# Patient Record
Sex: FEMALE | Race: WHITE | URBAN - METROPOLITAN AREA
[De-identification: names, ages, dates, MRNs, and addresses within clinical notes are randomized per-mention and may not be internally consistent; named-entity substitution may affect disease eponyms.]

---

## 2019-05-14 ENCOUNTER — OFFICE VISIT (OUTPATIENT)
Dept: FAMILY MEDICINE CLINIC | Age: 54
End: 2019-05-14

## 2019-05-14 VITALS
WEIGHT: 171.6 LBS | TEMPERATURE: 98.2 F | DIASTOLIC BLOOD PRESSURE: 84 MMHG | HEIGHT: 64 IN | OXYGEN SATURATION: 97 % | BODY MASS INDEX: 29.3 KG/M2 | SYSTOLIC BLOOD PRESSURE: 128 MMHG | HEART RATE: 75 BPM | RESPIRATION RATE: 17 BRPM

## 2019-05-14 DIAGNOSIS — S89.92XA INJURY OF LEFT KNEE, INITIAL ENCOUNTER: ICD-10-CM

## 2019-05-14 DIAGNOSIS — G89.29 CHRONIC PAIN OF LEFT KNEE: Primary | ICD-10-CM

## 2019-05-14 DIAGNOSIS — M25.562 CHRONIC PAIN OF LEFT KNEE: Primary | ICD-10-CM

## 2019-05-14 DIAGNOSIS — Z79.899 CONTROLLED SUBSTANCE AGREEMENT SIGNED: ICD-10-CM

## 2019-05-14 DIAGNOSIS — F41.9 ANXIETY: ICD-10-CM

## 2019-05-14 RX ORDER — LEVOTHYROXINE SODIUM 88 UG/1
TABLET ORAL
COMMUNITY
End: 2019-05-14 | Stop reason: SDUPTHER

## 2019-05-14 RX ORDER — SUMATRIPTAN 50 MG/1
50 TABLET, FILM COATED ORAL
Qty: 90 TAB | Refills: 0 | Status: SHIPPED | OUTPATIENT
Start: 2019-05-14 | End: 2019-05-14

## 2019-05-14 RX ORDER — ALPRAZOLAM 0.5 MG/1
0.5 TABLET ORAL
Qty: 30 TAB | Refills: 2 | Status: SHIPPED | OUTPATIENT
Start: 2019-05-14 | End: 2019-08-21 | Stop reason: SDUPTHER

## 2019-05-14 RX ORDER — ALPRAZOLAM 0.5 MG/1
0.5 TABLET ORAL
COMMUNITY
End: 2019-05-14 | Stop reason: SDUPTHER

## 2019-05-14 RX ORDER — LEVOTHYROXINE SODIUM 88 UG/1
88 TABLET ORAL
Qty: 90 TAB | Refills: 0 | Status: SHIPPED | OUTPATIENT
Start: 2019-05-14 | End: 2019-08-23 | Stop reason: DRUGHIGH

## 2019-05-14 RX ORDER — SUMATRIPTAN 50 MG/1
50 TABLET, FILM COATED ORAL
COMMUNITY
End: 2019-05-14 | Stop reason: SDUPTHER

## 2019-05-14 NOTE — PROGRESS NOTES
Seen at [facility], runner's knee, had cortisone injection 2019 , moved from [facility] Mari Ceron is a 52 y.o. female (: 1965) presenting to address: Chief Complaint Patient presents with  Knee Injury  
  left Vitals:  
 19 1446 BP: 128/84 Pulse: 75 Resp: 17 Temp: 98.2 °F (36.8 °C) TempSrc: Oral  
SpO2: 97% Weight: 171 lb 9.6 oz (77.8 kg) Height: 5' 4.33\" (1.634 m) PainSc:   3 PainLoc: Knee Hearing/Vision:  
 
 Visual Acuity Screening Right eye Left eye Both eyes Without correction:     
With correction: 20/20 20/20 20/20 Learning Assessment:  
 
Learning Assessment 2019 PRIMARY LEARNER Patient HIGHEST LEVEL OF EDUCATION - PRIMARY LEARNER  4 YEARS OF COLLEGE  
BARRIERS PRIMARY LEARNER NONE  
CO-LEARNER CAREGIVER No  
PRIMARY LANGUAGE ENGLISH  NEED No  
LEARNER PREFERENCE PRIMARY READING  
ANSWERED BY self RELATIONSHIP SELF Depression Screening: No flowsheet data found. Fall Risk Assessment:  
No flowsheet data found. Abuse Screening:  
 
Abuse Screening Questionnaire 2019 Do you ever feel afraid of your partner? Jeanne Lul Are you in a relationship with someone who physically or mentally threatens you? Jeanne Lul Is it safe for you to go home? Peter Manjarrez Coordination of Care Questionaire: 1. Have you been to the ER, urgent care clinic since your last visit? Hospitalized since your last visit? Yes, urgent care 2019  
 
 
2. Have you seen or consulted any other health care providers outside of the [address] [place] [address] since your last visit? Include any pap smears or colon screening. No  
 
Advanced Directive: 1. Do you have an Advanced Directive? No  
2. Would you like information on Advanced Directives? No  
 
Health Maintenance Due Topic Date Due  
 Hepatitis C Screening  1965  DTaP/Tdap/Td series (1 - Tdap) 10/06/1984  PAP AKA CERVICAL CYTOLOGY  10/06/1984  Shingrix Vaccine Age 50> (1 of 2) 10/06/2013  BREAST CANCER SCRN MAMMOGRAM  10/06/2013  FOBT Q 1 YEAR AGE 50-75  10/06/2013

## 2019-05-16 NOTE — PROGRESS NOTES
Subjective:  
  
Lore Rivera is a 52 y.o. female seen for evaluation and treatment of a left knee injury. Patient was seen in past 02/2019 by ortho in [place]. She moved here and has not been seen since. She continues with pain now. There are no active problems to display for this patient. Current Outpatient Medications Medication Sig Dispense Refill  multivitamin, tx-iron-ca-min (THERA-M W/ IRON) 9 mg iron-400 mcg tab tablet Take 1 Tab by mouth daily.  magnesium hydroxide (COTO MILK OF MAGNESIA) 311 mg chew Take  by mouth.  psyllium husk (METAMUCIL PO) Take  by mouth.  levothyroxine (SYNTHROID) 88 mcg tablet Take 1 Tab by mouth Daily (before breakfast). 90 Tab 0  ALPRAZolam (XANAX) 0.5 mg tablet Take 1 Tab by mouth daily as needed for Anxiety. 30 Tab 2  
 aluminum chloride (DRYSOL) 20 % external solution Apply 5 Drops to affected area nightly. 1 Bottle 1 No Known Allergies Past Medical History:  
Diagnosis Date  Anxiety 2011  
 Headache   
 since age 11  Thyroid disease 1999  
 hypothyroid Past Surgical History:  
Procedure Laterality Date [place] [facility]  2006  
 hysterectomy  HX HEENT    
 wisdom teeth Family History Problem Relation Age of Onset  Cancer Mother  Hypertension Mother  Cancer Father  Hypertension Brother  Cancer Paternal Grandmother Social History Tobacco Use  Smoking status: Former Smoker Packs/day: 0.25 Years: 10.00 Pack years: 2.50  Smokeless tobacco: Never Used Substance Use Topics  Alcohol use: Yes Comment: 2 beers a month Objective:  
 
Visit Vitals /84 (BP 1 Location: Left arm, BP Patient Position: Sitting) Pulse 75 Temp 98.2 °F (36.8 °C) (Oral) Resp 17 Ht 5' 4.33\" (1.634 m) Wt 171 lb 9.6 oz (77.8 kg) SpO2 97% BMI 29.15 kg/m² Appearance: alert, well appearing, and in no distress. Musculoskeletal exam: abnormal exam of left knee swelling noted, TTP on lateral side. Decreased flexion and extension with laxity in joint. Imaging 
is performed, appears abnormal - report shows patellofemoral compartment osteophyte formation Assessment/Plan:  
 
Encounter Diagnoses Name Primary?  Chronic pain of left knee Yes  Injury of left knee, initial encounter  Anxiety  Controlled substance agreement signed Orders Placed This Encounter  XR KNEE LT MIN 4 V  
 REFERRAL TO ORTHOPEDICS  
 DISCONTD: levothyroxine (SYNTHROID) 88 mcg tablet  DISCONTD: ALPRAZolam (XANAX) 0.5 mg tablet  DISCONTD: SUMAtriptan (IMITREX) 50 mg tablet  DISCONTD: aluminum chloride (DRYSOL) 20 % external solution  multivitamin, tx-iron-ca-min (THERA-M W/ IRON) 9 mg iron-400 mcg tab tablet  magnesium hydroxide (COTO MILK OF MAGNESIA) 311 mg chew  psyllium husk (METAMUCIL PO)  levothyroxine (SYNTHROID) 88 mcg tablet  ALPRAZolam (XANAX) 0.5 mg tablet  SUMAtriptan (IMITREX) 50 mg tablet  aluminum chloride (DRYSOL) 20 % external solution Refilled anxiety med  
 reviewed and ok Controlled signed Ortho referral entered  
rtc as needed

## 2019-08-21 ENCOUNTER — HOSPITAL ENCOUNTER (OUTPATIENT)
Dept: LAB | Age: 54
Discharge: HOME | End: 2019-08-21
Payer: COMMERCIAL

## 2019-08-21 ENCOUNTER — OFFICE VISIT (OUTPATIENT)
Dept: FAMILY MEDICINE CLINIC | Age: 54
End: 2019-08-21

## 2019-08-21 VITALS
OXYGEN SATURATION: 97 % | WEIGHT: 176.6 LBS | TEMPERATURE: 97.4 F | RESPIRATION RATE: 18 BRPM | HEART RATE: 70 BPM | DIASTOLIC BLOOD PRESSURE: 89 MMHG | HEIGHT: 64 IN | SYSTOLIC BLOOD PRESSURE: 137 MMHG | BODY MASS INDEX: 30.15 KG/M2

## 2019-08-21 DIAGNOSIS — F41.9 ANXIETY: ICD-10-CM

## 2019-08-21 DIAGNOSIS — Z00.00 ROUTINE GENERAL MEDICAL EXAMINATION AT A HEALTH CARE FACILITY: ICD-10-CM

## 2019-08-21 DIAGNOSIS — Z11.59 ENCOUNTER FOR HEPATITIS C SCREENING TEST FOR LOW RISK PATIENT: ICD-10-CM

## 2019-08-21 DIAGNOSIS — E03.9 ACQUIRED HYPOTHYROIDISM: ICD-10-CM

## 2019-08-21 DIAGNOSIS — E78.2 MIXED HYPERLIPIDEMIA: ICD-10-CM

## 2019-08-21 DIAGNOSIS — Z00.00 ROUTINE GENERAL MEDICAL EXAMINATION AT A HEALTH CARE FACILITY: Primary | ICD-10-CM

## 2019-08-21 DIAGNOSIS — Z23 ENCOUNTER FOR IMMUNIZATION: ICD-10-CM

## 2019-08-21 LAB
ALBUMIN SERPL-MCNC: 4.1 G/DL (ref 3.4–5)
ALBUMIN/GLOB SERPL: 1.2 {RATIO} (ref 0.8–1.7)
ALP SERPL-CCNC: 79 U/L (ref 45–117)
ALT SERPL-CCNC: 25 U/L (ref 13–56)
ANION GAP SERPL CALC-SCNC: 8 MMOL/L (ref 3–18)
AST SERPL-CCNC: 17 U/L (ref 10–38)
BILIRUB DIRECT SERPL-MCNC: <0.1 MG/DL (ref 0–0.2)
BILIRUB SERPL-MCNC: 0.4 MG/DL (ref 0.2–1)
BUN SERPL-MCNC: 19 MG/DL (ref 7–18)
BUN/CREAT SERPL: 20 (ref 12–20)
CALCIUM SERPL-MCNC: 9.4 MG/DL (ref 8.5–10.1)
CHLORIDE SERPL-SCNC: 102 MMOL/L (ref 100–111)
CO2 SERPL-SCNC: 29 MMOL/L (ref 21–32)
CREAT SERPL-MCNC: 0.96 MG/DL (ref 0.6–1.3)
ERYTHROCYTE [DISTWIDTH] IN BLOOD BY AUTOMATED COUNT: 14.3 % (ref 11.6–14.5)
EST. AVERAGE GLUCOSE BLD GHB EST-MCNC: 108 MG/DL
GLOBULIN SER CALC-MCNC: 3.3 G/DL (ref 2–4)
GLUCOSE SERPL-MCNC: 89 MG/DL (ref 74–99)
HBA1C MFR BLD: 5.4 % (ref 4.2–5.6)
HCT VFR BLD AUTO: 41.3 % (ref 35–45)
HGB BLD-MCNC: 13.4 G/DL (ref 12–16)
MCH RBC QN AUTO: 31.2 PG (ref 24–34)
MCHC RBC AUTO-ENTMCNC: 32.4 G/DL (ref 31–37)
MCV RBC AUTO: 96.3 FL (ref 74–97)
PLATELET # BLD AUTO: 368 K/UL (ref 135–420)
PMV BLD AUTO: 10.4 FL (ref 9.2–11.8)
POTASSIUM SERPL-SCNC: 5.2 MMOL/L (ref 3.5–5.5)
PROT SERPL-MCNC: 7.4 G/DL (ref 6.4–8.2)
RBC # BLD AUTO: 4.29 M/UL (ref 4.2–5.3)
SODIUM SERPL-SCNC: 139 MMOL/L (ref 136–145)
TSH SERPL DL<=0.05 MIU/L-ACNC: 4.23 UIU/ML (ref 0.36–3.74)
WBC # BLD AUTO: 5.5 K/UL (ref 4.6–13.2)

## 2019-08-21 PROCEDURE — 80061 LIPID PANEL: CPT

## 2019-08-21 PROCEDURE — 84443 ASSAY THYROID STIM HORMONE: CPT

## 2019-08-21 PROCEDURE — 80048 BASIC METABOLIC PNL TOTAL CA: CPT

## 2019-08-21 PROCEDURE — 83036 HEMOGLOBIN GLYCOSYLATED A1C: CPT

## 2019-08-21 PROCEDURE — 85027 COMPLETE CBC AUTOMATED: CPT

## 2019-08-21 PROCEDURE — 86803 HEPATITIS C AB TEST: CPT

## 2019-08-21 PROCEDURE — 80076 HEPATIC FUNCTION PANEL: CPT

## 2019-08-21 PROCEDURE — 36415 COLL VENOUS BLD VENIPUNCTURE: CPT

## 2019-08-21 PROCEDURE — 84439 ASSAY OF FREE THYROXINE: CPT

## 2019-08-21 RX ORDER — ALPRAZOLAM 0.5 MG/1
0.5 TABLET ORAL
Qty: 30 TAB | Refills: 2 | Status: SHIPPED | OUTPATIENT
Start: 2019-08-21 | End: 2020-02-19 | Stop reason: ALTCHOICE

## 2019-08-21 RX ORDER — RIZATRIPTAN BENZOATE 10 MG/1
10 TABLET ORAL
COMMUNITY

## 2019-08-21 NOTE — PROGRESS NOTES
Flu & TDAP Immunization/s administered 8/21/2019 by Lawson Strong LPN with patient consent. Patient tolerated procedure well. No reactions noted.

## 2019-08-21 NOTE — PROGRESS NOTES
Noelle Izquierdo is a 52 y.o. female (: 1965) presenting to address:    Chief Complaint   Patient presents with    Anxiety     follow up       Vitals:    19 0818   BP: 137/89   Pulse: 70   Resp: 18   Temp: 97.4 °F (36.3 °C)   TempSrc: Oral   SpO2: 97%   Weight: 176 lb 9.6 oz (80.1 kg)   Height: 5' 4.33\" (1.634 m)   PainSc:   2   PainLoc: Knee       Hearing/Vision:   No exam data present    Learning Assessment:     Learning Assessment 2019   PRIMARY LEARNER Patient   HIGHEST LEVEL OF EDUCATION - PRIMARY LEARNER  4 YEARS OF COLLEGE   BARRIERS PRIMARY LEARNER NONE   CO-LEARNER CAREGIVER No   PRIMARY LANGUAGE ENGLISH    NEED No   LEARNER PREFERENCE PRIMARY READING   ANSWERED BY self   RELATIONSHIP SELF     Depression Screening:     3 most recent PHQ Screens 2019   Little interest or pleasure in doing things Not at all   Feeling down, depressed, irritable, or hopeless Not at all   Total Score PHQ 2 0     Fall Risk Assessment:   No flowsheet data found. Abuse Screening:     Abuse Screening Questionnaire 2019   Do you ever feel afraid of your partner? N   Are you in a relationship with someone who physically or mentally threatens you? N   Is it safe for you to go home? Y     Coordination of Care Questionaire:   1. Have you been to the ER, urgent care clinic since your last visit? Hospitalized since your last visit? NO    2. Have you seen or consulted any other health care providers outside of the [address] [place] [address] since your last visit? Include any pap smears or colon screening. YES Ortho    Advanced Directive:   1. Do you have an Advanced Directive? NO    2. Would you like information on Advanced Directives?  NO

## 2019-08-21 NOTE — PROGRESS NOTES
Subjective:   52 y.o. female for Well Woman Check. Her gyne and breast care is done elsewhere by her Ob-Gyne physician. She will have records sent for mammo, pap. Current Outpatient Medications   Medication Sig Dispense Refill    rizatriptan (MAXALT) 10 mg tablet Take 10 mg by mouth once as needed for Migraine. May repeat in 2 hours if needed      ALPRAZolam (XANAX) 0.5 mg tablet Take 1 Tab by mouth daily as needed for Anxiety. 30 Tab 2    multivitamin, tx-iron-ca-min (THERA-M W/ IRON) 9 mg iron-400 mcg tab tablet Take 1 Tab by mouth daily.  magnesium hydroxide (COTO MILK OF MAGNESIA) 311 mg chew Take  by mouth.  psyllium husk (METAMUCIL PO) Take  by mouth.  levothyroxine (SYNTHROID) 88 mcg tablet Take 1 Tab by mouth Daily (before breakfast). 90 Tab 0    aluminum chloride (DRYSOL) 20 % external solution Apply 5 Drops to affected area nightly. 1 Bottle 1     No Known Allergies     Specific concerns today: Continues with intermittent anxiety. Uses Xanax as needed approximately 5 out of 7 days weekly. Continues to express good control with intermittent Xanax use. No SEs with medication use. Review of Systems  A comprehensive review of systems was negative. Objective:   Blood pressure 137/89, pulse 70, temperature 97.4 °F (36.3 °C), temperature source Oral, resp. rate 18, height 5' 4.33\" (1.634 m), weight 176 lb 9.6 oz (80.1 kg), SpO2 97 %.    Physical Examination:   General appearance - alert, well appearing, and in no distress  Mental status - alert, oriented to person, place, and time, normal mood, behavior, speech, dress, motor activity, and thought processes  Eyes - pupils equal and reactive, extraocular eye movements intact  Ears - bilateral TM's and external ear canals normal  Nose - normal and patent, no erythema, discharge or polyps  Mouth - mucous membranes moist, pharynx normal without lesions  Neck - supple, no significant adenopathy, thyroid exam: thyroid is normal in size without nodules or tenderness  Lymphatics - no palpable lymphadenopathy, no hepatosplenomegaly  Chest - clear to auscultation, no wheezes, rales or rhonchi, symmetric air entry  Heart - normal rate, regular rhythm, normal S1, S2, no murmurs, rubs, clicks or gallops  Abdomen - soft, nontender, nondistended, no masses or organomegaly  Neurological - alert, oriented, normal speech, no focal findings or movement disorder noted  Musculoskeletal - no joint tenderness, deformity or swelling  Extremities - peripheral pulses normal, no pedal edema, no clubbing or cyanosis  Skin - normal coloration and turgor, no rashes, no suspicious skin lesions noted     Assessment/Plan:   Continue Xanax for intermittent anxiety.  reviewed and okay refills provided. lose weight, increase physical activity, follow low fat diet, routine labs ordered, call if any problems  Encounter Diagnoses   Name Primary?  Routine general medical examination at a health care facility Yes    Encounter for immunization     Anxiety     Encounter for hepatitis C screening test for low risk patient      Orders Placed This Encounter    INFLUENZA VIRUS VACCINE QUADRIVALENT, PRESERVATIVE FREE SYRINGE (75404)    TETANUS, DIPHTHERIA TOXOIDS AND ACELLULAR PERTUSSIS VACCINE (TDAP), IN INDIVIDS. >=7, IM    CBC W/O DIFF    METABOLIC PANEL, BASIC    HEPATIC FUNCTION PANEL    LIPID PANEL    TSH 3RD GENERATION    T4, FREE    HEMOGLOBIN A1C WITH EAG    HEPATITIS C AB    rizatriptan (MAXALT) 10 mg tablet    ALPRAZolam (XANAX) 0.5 mg tablet     Labs ordered we will call patient with results, return to care for physical in 1 year return to care in 3 months for med follow-up.

## 2019-08-21 NOTE — PATIENT INSTRUCTIONS
Well Visit, Women 48 to 72: Care Instructions  Your Care Instructions    Physical exams can help you stay healthy. Your doctor has checked your overall health and may have suggested ways to take good care of yourself. He or she also may have recommended tests. At home, you can help prevent illness with healthy eating, regular exercise, and other steps. Follow-up care is a key part of your treatment and safety. Be sure to make and go to all appointments, and call your doctor if you are having problems. It's also a good idea to know your test results and keep a list of the medicines you take. How can you care for yourself at home? · Reach and stay at a healthy weight. This will lower your risk for many problems, such as obesity, diabetes, heart disease, and high blood pressure. · Get at least 30 minutes of exercise on most days of the week. Walking is a good choice. You also may want to do other activities, such as running, swimming, cycling, or playing tennis or team sports. · Do not smoke. Smoking can make health problems worse. If you need help quitting, talk to your doctor about stop-smoking programs and medicines. These can increase your chances of quitting for good. · Protect your skin from too much sun. When you're outdoors from 10 a.m. to 4 p.m., stay in the shade or cover up with clothing and a hat with a wide brim. Wear sunglasses that block UV rays. Even when it's cloudy, put broad-spectrum sunscreen (SPF 30 or higher) on any exposed skin. · See a dentist one or two times a year for checkups and to have your teeth cleaned. · Wear a seat belt in the car. Follow your doctor's advice about when to have certain tests. These tests can spot problems early. · Cholesterol. Your doctor will tell you how often to have this done based on your age, family history, or other things that can increase your risk for heart attack and stroke. · Blood pressure.  Have your blood pressure checked during a routine doctor visit. Your doctor will tell you how often to check your blood pressure based on your age, your blood pressure results, and other factors. · Mammogram. Ask your doctor how often you should have a mammogram, which is an X-ray of your breasts. A mammogram can spot breast cancer before it can be felt and when it is easiest to treat. · Pap test and pelvic exam. Ask your doctor how often you should have a Pap test. You may not need to have a Pap test as often as you used to. · Vision. Have your eyes checked every year or two or as often as your doctor suggests. Some experts recommend that you have yearly exams for glaucoma and other age-related eye problems starting at age 48. · Hearing. Tell your doctor if you notice any change in your hearing. You can have tests to find out how well you hear. · Diabetes. Ask your doctor whether you should have tests for diabetes. · Colorectal cancer. Your risk for colorectal cancer gets higher as you get older. Some experts say that adults should start regular screening at age 48 and stop at age 76. Others say to start before age 48 or continue after age 76. Talk with your doctor about your risk and when to start and stop screening. · Thyroid disease. Talk to your doctor about whether to have your thyroid checked as part of a regular physical exam. Women have an increased chance of a thyroid problem. · Osteoporosis. You should begin tests for bone density at age 72. If you are younger than 72, ask your doctor whether you have factors that may increase your risk for this disease. You may want to have this test before age 72. · Heart attack and stroke risk. At least every 4 to 6 years, you should have your risk for heart attack and stroke assessed. Your doctor uses factors such as your age, blood pressure, cholesterol, and whether you smoke or have diabetes to show what your risk for a heart attack or stroke is over the next 10 years.   When should you call for help?  Watch closely for changes in your health, and be sure to contact your doctor if you have any problems or symptoms that concern you. Where can you learn more? Go to http://hay-maria e.info/. Enter P206 in the search box to learn more about \"Well Visit, Women 50 to 72: Care Instructions. \"  Current as of: December 13, 2018  Content Version: 12.1  © 3021-1830 [employer], [employer]. Care instructions adapted under license by [facility] (which disclaims liability or warranty for this information). If you have questions about a medical condition or this instruction, always ask your healthcare professional. [address] any warranty or liability for your use of this information.

## 2019-08-22 LAB
CHOLEST SERPL-MCNC: 327 MG/DL
HCV AB SER IA-ACNC: 0.07 INDEX
HCV AB SERPL QL IA: NEGATIVE
HCV COMMENT,HCGAC: NORMAL
HDLC SERPL-MCNC: 76 MG/DL (ref 40–60)
HDLC SERPL: 4.3 {RATIO} (ref 0–5)
LDLC SERPL CALC-MCNC: 226 MG/DL (ref 0–100)
LIPID PROFILE,FLP: ABNORMAL
T4 FREE SERPL-MCNC: 1.2 NG/DL (ref 0.7–1.5)
TRIGL SERPL-MCNC: 125 MG/DL (ref ?–150)
VLDLC SERPL CALC-MCNC: 25 MG/DL

## 2019-08-23 RX ORDER — ATORVASTATIN CALCIUM 20 MG/1
20 TABLET, FILM COATED ORAL DAILY
Qty: 90 TAB | Refills: 0 | Status: SHIPPED | OUTPATIENT
Start: 2019-08-23 | End: 2020-02-19 | Stop reason: ALTCHOICE

## 2019-08-23 RX ORDER — LEVOTHYROXINE SODIUM 100 UG/1
100 TABLET ORAL
Qty: 90 TAB | Refills: 0 | Status: SHIPPED | OUTPATIENT
Start: 2019-08-23 | End: 2019-11-14 | Stop reason: SDUPTHER

## 2019-12-27 ENCOUNTER — TELEPHONE (OUTPATIENT)
Dept: FAMILY MEDICINE CLINIC | Age: 54
End: 2019-12-27

## 2019-12-27 DIAGNOSIS — E03.9 ACQUIRED HYPOTHYROIDISM: Primary | ICD-10-CM

## 2019-12-27 RX ORDER — LEVOTHYROXINE SODIUM 100 UG/1
TABLET ORAL
Qty: 30 TAB | Refills: 0 | Status: SHIPPED | OUTPATIENT
Start: 2019-12-27 | End: 2020-01-29

## 2019-12-27 NOTE — TELEPHONE ENCOUNTER
Informed pt Rx was filled for 30 day of thyroid medication; pt must establish care w/new provider and get labs completed before any more refills are approved; call was transferred to  for scheduling.

## 2019-12-27 NOTE — TELEPHONE ENCOUNTER
Patient called requesting a refill of thyroid medication; advised pt refill was denied d/t labs not completed; pt states she was not informed of that message; pt states she has 2 pills remaining [place] and will complete her labs upon her return and is requesting a 30 day refill of thyroid medication.

## 2020-01-29 ENCOUNTER — HOSPITAL ENCOUNTER (OUTPATIENT)
Dept: LAB | Age: 55
Discharge: HOME | End: 2020-01-29
Payer: COMMERCIAL

## 2020-01-29 DIAGNOSIS — E03.9 ACQUIRED HYPOTHYROIDISM: ICD-10-CM

## 2020-01-29 DIAGNOSIS — E78.2 MIXED HYPERLIPIDEMIA: ICD-10-CM

## 2020-01-29 LAB
CHOLEST SERPL-MCNC: 254 MG/DL
HDLC SERPL-MCNC: 65 MG/DL (ref 40–60)
HDLC SERPL: 3.9 {RATIO} (ref 0–5)
LDLC SERPL CALC-MCNC: 166.2 MG/DL (ref 0–100)
LIPID PROFILE,FLP: ABNORMAL
T4 FREE SERPL-MCNC: 1.5 NG/DL (ref 0.7–1.5)
TRIGL SERPL-MCNC: 114 MG/DL (ref ?–150)
TSH SERPL DL<=0.05 MIU/L-ACNC: 2.3 UIU/ML (ref 0.36–3.74)
VLDLC SERPL CALC-MCNC: 22.8 MG/DL

## 2020-01-29 PROCEDURE — 84439 ASSAY OF FREE THYROXINE: CPT

## 2020-01-29 PROCEDURE — 80061 LIPID PANEL: CPT

## 2020-01-29 PROCEDURE — 36415 COLL VENOUS BLD VENIPUNCTURE: CPT

## 2020-01-29 PROCEDURE — 84443 ASSAY THYROID STIM HORMONE: CPT

## 2020-02-19 ENCOUNTER — OFFICE VISIT (OUTPATIENT)
Dept: FAMILY MEDICINE CLINIC | Age: 55
End: 2020-02-19

## 2020-02-19 VITALS
WEIGHT: 179 LBS | SYSTOLIC BLOOD PRESSURE: 138 MMHG | TEMPERATURE: 98.5 F | RESPIRATION RATE: 20 BRPM | HEIGHT: 64 IN | OXYGEN SATURATION: 99 % | DIASTOLIC BLOOD PRESSURE: 90 MMHG | BODY MASS INDEX: 30.56 KG/M2 | HEART RATE: 78 BPM

## 2020-02-19 DIAGNOSIS — E03.9 ACQUIRED HYPOTHYROIDISM: ICD-10-CM

## 2020-02-19 DIAGNOSIS — G43.109 MIGRAINE WITH AURA AND WITHOUT STATUS MIGRAINOSUS, NOT INTRACTABLE: ICD-10-CM

## 2020-02-19 DIAGNOSIS — R03.0 PREHYPERTENSION: ICD-10-CM

## 2020-02-19 DIAGNOSIS — F51.04 PSYCHOPHYSIOLOGICAL INSOMNIA: ICD-10-CM

## 2020-02-19 DIAGNOSIS — Z12.39 SCREENING FOR BREAST CANCER: ICD-10-CM

## 2020-02-19 DIAGNOSIS — E78.00 PURE HYPERCHOLESTEROLEMIA: ICD-10-CM

## 2020-02-19 DIAGNOSIS — F41.1 GAD (GENERALIZED ANXIETY DISORDER): Primary | ICD-10-CM

## 2020-02-19 RX ORDER — LEVOTHYROXINE SODIUM 100 UG/1
100 TABLET ORAL
Qty: 90 TAB | Refills: 3 | Status: SHIPPED | OUTPATIENT
Start: 2020-02-19 | End: 2020-03-02

## 2020-02-19 RX ORDER — BUSPIRONE HYDROCHLORIDE 10 MG/1
10 TABLET ORAL 2 TIMES DAILY
Qty: 60 TAB | Refills: 0 | Status: SHIPPED | OUTPATIENT
Start: 2020-02-19 | End: 2020-06-15 | Stop reason: ALTCHOICE

## 2020-02-19 RX ORDER — TRAZODONE HYDROCHLORIDE 50 MG/1
50 TABLET ORAL
Qty: 30 TAB | Refills: 0 | Status: SHIPPED | OUTPATIENT
Start: 2020-02-19 | End: 2020-06-15 | Stop reason: ALTCHOICE

## 2020-02-19 NOTE — PROGRESS NOTES
Assessment/Plan:    1. ISABELLA (generalized anxiety disorder)  -discussed risk of longterm benzo use. Intol effexor. Let's try buspar. Stop xanax. - busPIRone (BUSPAR) 10 mg tablet; Take 1 Tab by mouth two (2) times a day. Dispense: 60 Tab; Refill: 0    2. Acquired hypothyroidism  -cont current. tsh good  - levothyroxine (SYNTHROID) 100 mcg tablet; Take 1 Tab by mouth Daily (before breakfast). Dispense: 90 Tab; Refill: 3    3. Prehypertension  -work on wt loss    4. Pure hypercholesterolemia  -declines statin. Work on diet    5. Migraine with aura and without status migrainosus, not intractable  -maxalt prn    6. Psychophysiological insomnia  -trial trazodone prn. Stop xanax  - traZODone (DESYREL) 50 mg tablet; Take 1 Tab by mouth nightly as needed for Sleep. Dispense: 30 Tab; Refill: 0    7. Screening for breast cancer  - [facility] MAMMO BI SCREENING INCL CAD; Future      The plan was discussed with the patient. The patient verbalized understanding and is in agreement with the plan. All medication potential side effects were discussed with the patient. Health Maintenance:  Get colo from [facility]. Health Maintenance   Topic Date Due    Breast Cancer Screen Mammogram  10/06/2013    FOBT Q1Y Age 54-65  10/06/2013    Shingrix Vaccine Age 50> (1 of 2) 07/03/2020 (Originally 10/6/2013)    Lipid Screen  01/29/2025    DTaP/Tdap/Td series (2 - Td) 08/21/2029    Hepatitis C Screening  Completed    Influenza Age 5 to Adult  Completed    Pneumococcal 0-64 years  Aged Out       Chele Campos is a 62 y.o. female and presents with Anxiety and Medication Evaluation     Subjective:  Pt here to establish care from [facility]. She is on xanax for sleep. Uses it fairly regularly. Not on maintenance med. A few years ago was started on xanax by psych. She uses it almost nightly. Previously on effexor, but she didn't like how she felt on it- had self-harming thoughts. No depression.       HLD - didn't take statin prescribed. Wants to work on diet/lifestyle changes. Migraines - has long hx, dx in teens. Previously were menstrual related. Improved since menopause. But gets one about every 8 weeks. Intensity is less. Uses maxalt prn. Does get aura. Hypothyroid - on synthroid. tsh recently was good. ROS:  Constitutional: No recent weight change. No weakness/fatigue. No f/c. Skin: No rashes, change in nails/hair, itching   HENT: No HA, dizziness. No hearing loss/tinnitus. No nasal congestion/discharge. Eyes: No change in vision, double/blurred vision or eye pain/redness. Cardiovascular: No CP/palpitations. No BARTHOLOMEW/orthopnea/PND. Respiratory: No cough/sputum, dyspnea, wheezing. Gastointestinal: No dysphagia, reflux. No n/v. No constipation/diarrhea. No melena/rectal bleeding. Genitourinary: No dysuria, urinary hesitancy, nocturia, hematuria. No incontinence. Musculoskeletal: No joint pain/stiffness. No muscle pain/tenderness. Endo: No heat/cold intolerance, no polyuria/polydypsia. Heme: No h/o anemia. No easy bleeding/bruising. Allergy/Immunology: No seasonal rhinitis. Denies frequent colds, sinus/ear infections. Neurological: No seizures/numbness/weakness. No paresthesias. Psychiatric:  No depression, +anxiety. The problem list was updated as a part of today's visit. Patient Active Problem List   Diagnosis Code    Acquired hypothyroidism E03.9    Pure hypercholesterolemia E78.00    Prehypertension R03.0    Migraine with aura and without status migrainosus, not intractable G43.109       The PSH, FH were reviewed. SH:  Social History     Tobacco Use    Smoking status: Former Smoker     Packs/day: 0.25     Years: 10.00     Pack years: 2.50     Last attempt to quit: 1998     Years since quittin.2    Smokeless tobacco: Never Used   Substance Use Topics    Alcohol use:  Yes     Alcohol/week: 0.0 standard drinks     Comment: 2 beers a month     Drug use: Never Medications/Allergies:  Current Outpatient Medications on File Prior to Visit   Medication Sig Dispense Refill    levothyroxine (SYNTHROID) 100 mcg tablet TAKE 1 TABLET BY MOUTH DAILY BEFORE BREAKFAST 30 Tab 0    rizatriptan (MAXALT) 10 mg tablet Take 10 mg by mouth once as needed for Migraine. May repeat in 2 hours if needed      ALPRAZolam (XANAX) 0.5 mg tablet Take 1 Tab by mouth daily as needed for Anxiety. 30 Tab 2    multivitamin, tx-iron-ca-min (THERA-M W/ IRON) 9 mg iron-400 mcg tab tablet Take 1 Tab by mouth daily.  magnesium hydroxide (COTO MILK OF MAGNESIA) 311 mg chew Take  by mouth.  psyllium husk (METAMUCIL PO) Take  by mouth.  aluminum chloride (DRYSOL) 20 % external solution Apply 5 Drops to affected area nightly. 1 Bottle 1    atorvastatin (LIPITOR) 20 mg tablet Take 1 Tab by mouth daily. 90 Tab 0     No current facility-administered medications on file prior to visit. No Known Allergies    Objective:  Visit Vitals  /90 (BP 1 Location: Left arm, BP Patient Position: Sitting)   Pulse 78   Temp 98.5 °F (36.9 °C)   Resp 20   Ht 5' 4.33\" (1.634 m)   Wt 179 lb (81.2 kg)   SpO2 99%   BMI 30.41 kg/m²      Constitutional: Well developed, nourished, no distress, alert, obese habitus   CV: S1, S2.  RRR. No murmurs/rubs. No edema. Pulm: No abnormalities on inspection. Clear to auscultation bilaterally. No wheezing/rhonchi. Normal effort. Psych: Appropriate affect, judgement and insight. Short-term memory intact.        Labwork and Ancillary Studies:    CBC w/Diff  Lab Results   Component Value Date/Time    WBC 5.5 08/21/2019 09:16 AM    HGB 13.4 08/21/2019 09:16 AM    PLATELET 111 41/38/9680 09:16 AM         Basic Metabolic Profile/LFTs  Lab Results   Component Value Date/Time    Sodium 139 08/21/2019 09:16 AM    Potassium 5.2 08/21/2019 09:16 AM    Chloride 102 08/21/2019 09:16 AM    CO2 29 08/21/2019 09:16 AM    Anion gap 8 08/21/2019 09:16 AM Glucose 89 08/21/2019 09:16 AM    BUN 19 (H) 08/21/2019 09:16 AM    Creatinine 0.96 08/21/2019 09:16 AM    BUN/Creatinine ratio 20 08/21/2019 09:16 AM    GFR est AA >60 08/21/2019 09:16 AM    GFR est non-AA >60 08/21/2019 09:16 AM    Calcium 9.4 08/21/2019 09:16 AM      Lab Results   Component Value Date/Time    ALT (SGPT) 25 08/21/2019 09:16 AM    AST (SGOT) 17 08/21/2019 09:16 AM    Alk.  phosphatase 79 08/21/2019 09:16 AM    Bilirubin, direct <0.1 08/21/2019 09:16 AM    Bilirubin, total 0.4 08/21/2019 09:16 AM       Cholesterol  Lab Results   Component Value Date/Time    Cholesterol, total 254 (H) 01/29/2020 07:36 AM    HDL Cholesterol 65 (H) 01/29/2020 07:36 AM    LDL, calculated 166.2 (H) 01/29/2020 07:36 AM    Triglyceride 114 01/29/2020 07:36 AM    CHOL/HDL Ratio 3.9 01/29/2020 07:36 AM

## 2020-02-19 NOTE — PROGRESS NOTES
Reji Santos is a 62 y.o. female (: 1965) presenting to address:    Chief Complaint   Patient presents with    Anxiety    Medication Evaluation       Vitals:    20 0825 20 0832   BP: (!) 146/98 138/90   Pulse: 78    Resp: 20    Temp: 98.5 °F (36.9 °C)    SpO2: 99%    Weight: 179 lb (81.2 kg)    Height: 5' 4.33\" (1.634 m)    PainSc:   0 - No pain        Hearing/Vision:   No exam data present    Learning Assessment:     Learning Assessment 2019   PRIMARY LEARNER Patient   HIGHEST LEVEL OF EDUCATION - PRIMARY LEARNER  4 YEARS OF COLLEGE   BARRIERS PRIMARY LEARNER NONE   CO-LEARNER CAREGIVER No   PRIMARY LANGUAGE ENGLISH    NEED No   LEARNER PREFERENCE PRIMARY READING   ANSWERED BY self   RELATIONSHIP SELF     Depression Screening:     3 most recent PHQ Screens 2020   Little interest or pleasure in doing things Not at all   Feeling down, depressed, irritable, or hopeless Not at all   Total Score PHQ 2 0     Fall Risk Assessment:   No flowsheet data found. Abuse Screening:     Abuse Screening Questionnaire 2020   Do you ever feel afraid of your partner? N   Are you in a relationship with someone who physically or mentally threatens you? N   Is it safe for you to go home? Y     Coordination of Care Questionaire:   1. Have you been to the ER, urgent care clinic since your last visit? Hospitalized since your last visit? No   2. Have you seen or consulted any other health care providers outside of the [address] [place] [address] since your last visit? Include any pap smears or colon screening. No     Advanced Directive:   1. Do you have an Advanced Directive? No   2. Would you like information on Advanced Directives?    No       Health Maintenance Due   Topic Date Due    PAP AKA CERVICAL CYTOLOGY  10/06/1984    Breast Cancer Screen Mammogram  10/06/2013    FOBT Q1Y Age 50-75  10/06/2013     Over due for mammogram   [place] at age 46 in [facility], good for 10 years

## 2020-12-02 RX ORDER — ALUMINUM CHLORIDE 20 %
5 SOLUTION, NON-ORAL TOPICAL
Qty: 1 BOTTLE | Refills: 1 | Status: SHIPPED | OUTPATIENT
Start: 2020-12-02

## 2020-12-29 ENCOUNTER — VIRTUAL VISIT (OUTPATIENT)
Dept: FAMILY MEDICINE CLINIC | Age: 55
End: 2020-12-29
Payer: COMMERCIAL

## 2020-12-29 DIAGNOSIS — M62.838 CERVICAL PARASPINAL MUSCLE SPASM: Primary | ICD-10-CM

## 2020-12-29 DIAGNOSIS — L98.9 SCALP LESION: ICD-10-CM

## 2020-12-29 PROCEDURE — 99214 OFFICE O/P EST MOD 30 MIN: CPT | Performed by: INTERNAL MEDICINE

## 2020-12-29 RX ORDER — NAPROXEN 500 MG/1
500 TABLET ORAL 2 TIMES DAILY WITH MEALS
Qty: 60 TAB | Refills: 0 | Status: SHIPPED | OUTPATIENT
Start: 2020-12-29

## 2020-12-29 RX ORDER — TIZANIDINE 4 MG/1
4 TABLET ORAL
Qty: 30 TAB | Refills: 0 | Status: SHIPPED | OUTPATIENT
Start: 2020-12-29 | End: 2021-01-07

## 2020-12-29 NOTE — PATIENT INSTRUCTIONS
[facility] Dr. Didi Barrow, Dr. Shaq Lee [facility], [address] [facility], [address] [place] [address] 
 [phone] [facility] Dr. Magdaleno Kovacs [facility], [address] [facility] [phone]

## 2020-12-29 NOTE — PROGRESS NOTES
Jin Villalpando is a 60 y.o. female who was seen by synchronous (real-time) audio-video technology on 12/29/2020 for Skin Problem    Assessment & Plan:   Diagnoses and all orders for this visit:    1. Cervical paraspinal muscle spasm- heat, stretches and nsaids.  -     naproxen (NAPROSYN) 500 mg tablet; Take 1 Tab by mouth two (2) times daily (with meals). -     tiZANidine (ZANAFLEX) 4 mg tablet; Take 1 Tab by mouth three (3) times daily as needed for Muscle Spasm(s). 2. Scalp lesion- concerned for malignancy. Referral derm. Pt to make appt with Dr. Umesh Umaña. Subjective:     Pt states she has eczema and has a patch on her scalp that's \"bad\". There is brown papule surrounded by white patch. It oozes clear liquid. It's itchy. Initially it was painful. Pt also c/o R neck pain x 3 weeks. States it feels like when she sleeps wrong. It radiates down to deltoid. Sits a lot for work now. Has tried setting up an ergonomic work station. Prior to Admission medications    Medication Sig Start Date End Date Taking? Authorizing Provider   aluminum chloride (Drysol) 20 % external solution Apply 5 Drops to affected area nightly. 12/2/20   Alexander Hugo MD   levothyroxine (SYNTHROID) 100 mcg tablet TAKE 1 TABLET BY MOUTH DAILY BEFORE BREAKFAST 3/2/20   Maggie Kwan MD   rizatriptan (MAXALT) 10 mg tablet Take 10 mg by mouth once as needed for Migraine. May repeat in 2 hours if needed    Provider, Historical   multivitamin, tx-iron-ca-min (THERA-M W/ IRON) 9 mg iron-400 mcg tab tablet Take 1 Tab by mouth daily. Provider, Historical   magnesium hydroxide (COTO MILK OF MAGNESIA) 311 mg chew Take  by mouth. Provider, Historical   psyllium husk (METAMUCIL PO) Take  by mouth.     Provider, Historical     Patient Active Problem List   Diagnosis Code    Acquired hypothyroidism E03.9    Pure hypercholesterolemia E78.00    Prehypertension R03.0    Migraine with aura and without status migrainosus, not intractable G43. 80    ISABELLA (generalized anxiety disorder) F41.1       Review of Systems   Musculoskeletal: Positive for neck pain. Skin: Positive for itching and rash. Neurological: Negative for focal weakness. Objective:     Patient-Reported Vitals 12/29/2020   Patient-Reported Weight 185   Patient-Reported Height 5'4\"        [INSTRUCTIONS:  \"[x]\" Indicates a positive item  \"[]\" Indicates a negative item  -- DELETE ALL ITEMS NOT EXAMINED]    Constitutional: [x] Appears well-developed and well-nourished [x] No apparent distress      [] Abnormal -     Mental status: [x] Alert and awake  [x] Oriented to person/place/time [x] Able to follow commands    [] Abnormal -     Eyes:   EOM    [x]  Normal    [] Abnormal -   Sclera  [x]  Normal    [] Abnormal -          Discharge [x]  None visible   [] Abnormal -     HENT: [x] Normocephalic, atraumatic  [] Abnormal -   [x] Mouth/Throat: Mucous membranes are moist    External Ears [x] Normal  [] Abnormal -    Neck: [x] No visualized mass [] Abnormal -     Pulmonary/Chest: [x] Respiratory effort normal   [x] No visualized signs of difficulty breathing or respiratory distress        [] Abnormal -      Musculoskeletal:   [] Normal gait with no signs of ataxia         [] Normal range of motion of neck        [] Abnormal - pain with looking toward R. No pain with full ROM of R shoulder. Neurological:        [] No Facial Asymmetry (Cranial nerve 7 motor function) (limited exam due to video visit)          [] No gaze palsy        [] Abnormal -          Skin:        [] No significant exanthematous lesions or discoloration noted on facial skin         [x] Abnormal -            Psychiatric:       [x] Normal Affect [] Abnormal -        [x] No Hallucinations    Other pertinent observable physical exam findings:-        We discussed the expected course, resolution and complications of the diagnosis(es) in detail.   Medication risks, benefits, costs, interactions, and alternatives were discussed as indicated. I advised her to contact the office if her condition worsens, changes or fails to improve as anticipated. She expressed understanding with the diagnosis(es) and plan. Gianna Neri, who was evaluated through a patient-initiated, synchronous (real-time) audio-video encounter, and/or her healthcare decision maker, is aware that it is a billable service, with coverage as determined by her insurance carrier. She provided verbal consent to proceed: Yes, and patient identification was verified. It was conducted pursuant to the emergency declaration under the [address] [place] [address], [address] [place] [address] authority and the [employer] General Act. A caregiver was present when appropriate. Ability to conduct physical exam was limited. I was in the office. The patient was at home.       Germán Rutherford MD

## 2021-01-07 DIAGNOSIS — M62.838 CERVICAL PARASPINAL MUSCLE SPASM: ICD-10-CM

## 2021-01-07 RX ORDER — TIZANIDINE 4 MG/1
TABLET ORAL
Qty: 30 TAB | Refills: 0 | Status: SHIPPED | OUTPATIENT
Start: 2021-01-07

## 2021-01-14 ENCOUNTER — TELEPHONE (OUTPATIENT)
Dept: FAMILY MEDICINE CLINIC | Age: 56
End: 2021-01-14

## 2021-01-14 NOTE — TELEPHONE ENCOUNTER
Pt returned call. WG requested this. She is not taking the medication that frequently. She said she will  the 2nd refill (from 1/7/21) today but doesn't need any more at this time. She said we can decline the refill.

## 2021-01-14 NOTE — TELEPHONE ENCOUNTER
WG sent refill request for tizanidine 4mg. This was ordered tid 12/29/20 and 1/07/2021 and now is coming for a refill today. Called pt to see if she is still taking this tid and truly needs a refill. Pt may need to be re-evaluated. Cannot reach pt, left vm to call back. Reviewed with Dr. Dottie Hahn. If pt is taking tid since 12/29/2020 she may need to re-evaluated in office.

## 2022-03-18 PROBLEM — G43.109 MIGRAINE WITH AURA AND WITHOUT STATUS MIGRAINOSUS, NOT INTRACTABLE: Status: ACTIVE | Noted: 2020-02-19

## 2022-03-19 PROBLEM — E78.00 PURE HYPERCHOLESTEROLEMIA: Status: ACTIVE | Noted: 2020-02-19

## 2022-03-19 PROBLEM — E03.9 ACQUIRED HYPOTHYROIDISM: Status: ACTIVE | Noted: 2020-02-19

## 2022-03-19 PROBLEM — F41.1 GAD (GENERALIZED ANXIETY DISORDER): Status: ACTIVE | Noted: 2020-02-19

## 2022-03-19 PROBLEM — R03.0 PREHYPERTENSION: Status: ACTIVE | Noted: 2020-02-19

## 2023-05-18 RX ORDER — TIZANIDINE 4 MG/1
1 TABLET ORAL 3 TIMES DAILY PRN
COMMUNITY
Start: 2021-01-07

## 2023-05-18 RX ORDER — NAPROXEN 500 MG/1
500 TABLET ORAL 2 TIMES DAILY WITH MEALS
COMMUNITY
Start: 2020-12-29

## 2023-05-18 RX ORDER — RIZATRIPTAN BENZOATE 10 MG/1
10 TABLET ORAL
COMMUNITY

## 2023-05-18 RX ORDER — LEVOTHYROXINE SODIUM 0.1 MG/1
TABLET ORAL
COMMUNITY
Start: 2020-03-02

## 2025-04-04 ENCOUNTER — TELEPHONE (OUTPATIENT)
Facility: HOSPITAL | Age: 60
End: 2025-04-04

## 2025-04-04 NOTE — TELEPHONE ENCOUNTER
Called pt to remind about upcoming eval and asked pt to check in at 7:50 to fill out ppw. Asked pt to bring insurance card & ID.

## 2025-04-08 ENCOUNTER — HOSPITAL ENCOUNTER (OUTPATIENT)
Facility: HOSPITAL | Age: 60
Setting detail: RECURRING SERIES
Discharge: HOME | End: 2025-04-11
Payer: COMMERCIAL

## 2025-04-08 PROCEDURE — 97530 THERAPEUTIC ACTIVITIES: CPT

## 2025-04-08 PROCEDURE — 97535 SELF CARE MNGMENT TRAINING: CPT

## 2025-04-08 PROCEDURE — 97161 PT EVAL LOW COMPLEX 20 MIN: CPT

## 2025-04-08 NOTE — THERAPY EVALUATION
MARIUSZ GONZÁLES [facility]  [id number] [facility], [address], [place] [address] Ph:[phone] Fx: [phone]  Plan of Care / Statement of Necessity for Physical Therapy Services     Patient Name: Tracy Rizzo : 1965   Medical   Diagnosis: Pain in right leg  Treatment Diagnosis:  M79.604  Pain in right leg  and M54.59, G89.29  CHRONIC LOWER BACK PAIN    Onset Date: 20+ YEARS ()     Referral Source: Davin Warren PA-C Start of Care (SOC): 2025   Prior Hospitalization: See medical history Provider #: [id number]   Prior Level of Function: Ind and pain free with ADLs and activities of leisure; gardening   Comorbidities: Other: thyroid. cholesterol       Assessment / key information:    Patient is a 59 y.o. year old female. Primary complaints of Pain in right leg that began on 20 years ago and most recent flare up at end of February due to unknown BERNADETTE. Rates pain as 3-5/10 from best to worst. Pain today is rated as 4/10. Describes pain as entire R LE as numb and tingling; inner thigh on R and lateral distal thigh is most painful, bilateral lower lumbar pain equal bilaterally and constant in nature. Heating pad, massage, anti-inflammatories (taken edge off) makes the pain better. Prolonged periods of static positioning; squatting/kneeling/crouching makes the pain worse. Sleeping has been affected due to pain waking her up at night; waking every 1-2 hours; prefers back sleeping, but unable to comfortably assume this position for sleeping; rolling onto R side with pillow between legs and legs staggered is most comfortable currently.  Patient is employed as a  for [employer], which requires sitting for prolonged periods (80% sitting versus 20% opening boxes and lifting/unpacking; ~ 20 lbs). Patient enjoys taking part in gardening (flowers, weeding, trimming shrubs, repotting). Their primary goals in PT are to find \"relief\" in order to take part in these activities of

## 2025-04-08 NOTE — PROGRESS NOTES
PHYSICAL / OCCUPATIONAL THERAPY - DAILY TREATMENT NOTE (updated )  For Eval visit    Patient Name: Tracy Rizzo    Date: 2025    : 1965  Insurance: Payor: [employer] / Plan: [employer] NAP CHOICE POS II / Product Type: *No Product type* /      Patient  verified yes     Visit #   Current / Total 1 16   Time   In / Out 820 am 900 am   Pain   In / Out 4 4   Subjective Functional Status/Changes: See POC     TREATMENT AREA =  Pain in right leg [M79.604]    OBJECTIVE    20 min   Eval - untimed                      Therapeutic Procedures:  Tx Min Billable or 1:1 Min (if diff from Tx Min) Procedure, Rationale, Specifics   10  89585 Therapeutic Activity (timed):  use of dynamic activities replicating functional movements to increase ROM, strength, coordination, balance, and proprioception in order to improve patient's ability to progress to PLOF and address remaining functional goals.  (see flow sheet as applicable)     Details if applicable:     10  96392 Self Care/Home Management (timed):  improve patient knowledge and understanding of home injury/symptom/pain management, positioning, posture/ergonomics, home safety, activity modification, transfer techniques, and joint protection strategies  to improve patient's ability to progress to PLOF and address remaining functional goals.  (see flow sheet as applicable)     Details if applicable:     20  [facility] Totals Reminder: bill using total billable min of TIMED therapeutic procedures (example: do not include dry needle or estim unattended, both untimed codes, in totals to left)  8-22 min = 1 unit; 23-37 min = 2 units; 38-52 min = 3 units; 53-67 min = 4 units; 68-82 min = 5 units   Total Total     [x]  Patient Education billed concurrently with other procedures   [x] Review HEP    [] Progressed/Changed HEP, detail:    [] Other detail:       Objective Information/Functional Measures/Assessment    See POC.    Patient will continue to benefit from skilled PT / OT services

## 2025-05-02 ENCOUNTER — HOSPITAL ENCOUNTER (OUTPATIENT)
Facility: HOSPITAL | Age: 60
Setting detail: RECURRING SERIES
Discharge: HOME | End: 2025-05-05
Payer: COMMERCIAL

## 2025-05-02 PROCEDURE — 97112 NEUROMUSCULAR REEDUCATION: CPT

## 2025-05-02 PROCEDURE — 97535 SELF CARE MNGMENT TRAINING: CPT

## 2025-05-02 PROCEDURE — 97110 THERAPEUTIC EXERCISES: CPT

## 2025-05-02 NOTE — THERAPY RECERTIFICATION
Patient will show compliance with regular HEP performance as prescribed by PT.   Status at last Eval: Established Current Status: Provided today Progressing      Long Term Goals: To be accomplished in 8 weeks                Goal/Measure of Progress - LTGs Goal Met?   1.     Patient will reduce max pain to 1/10 in order to perform gardening activities.   Status at last Eval: Established Current Status: 6/10 Worsened Slightly   2.     Patient will improve glute and ham strength to 5/5 (bridges 20 reps at 100% mobility in order to return to squatting and crouching.   Status at last Eval: Established Current Status: Unchanged Progressing   3.     Patient will improve lumbar extension ROM to 75% with no pain increase for improved functional mobility.   Status at last Eval: Established Current Status: Unchanged Progressing   4.     Patient will improve LEFS score by 9 points for improved functional mobility and to return to PLOF.    Status at last Eval: Established Current Status: 44/80 (7 pt improvement) Progressing     All above \"progressing\" Goals remain appropriate and are to be achieved in __4-6__ weeks    RECOMMENDATIONS  Continue per initial Plan of Care.  Suggesting consistency with 1-2x/week x 4-6 weeks to address remaining deficits, improve functional mobility, return patient to their PLOF and prevent secondary impairments. Thank you for allowing us to treat your patients.     If you have any questions/comments please contact us directly at [phone].   Thank you for allowing us to assist in the care of your patient.    Amarilis Boss, PT       5/2/2025       6:27 AM    If an interpreting service was utilized for treatment of this patient, the contents of this document represent the material reviewed with the patient via the .     Payor: MIGUEL ANGEL / Plan: [employer] NAP CHOICE POS II / Product Type: *No Product type* /     Non-Medicare, NO NEW GOALS, can adjust or add frequency duration, no signature

## 2025-05-02 NOTE — PROGRESS NOTES
PHYSICAL / OCCUPATIONAL THERAPY - DAILY TREATMENT NOTE (updated )    Patient Name: Tracy Rizzo    Date: 2025    : 1965  Insurance: Payor: AETANGELICA / Plan: AETNA NAP CHOICE POS II / Product Type: *No Product type* /      Patient  verified yes     Visit #   Current / Total 2 16   Time   In / Out 740 am 820 am   Pain   In / Out 6 (R LE) 5   Subjective Functional Status/Changes: Pt has not been seen in approx 1 month. PN due this session however minimal progress made thus far as this is her 1st follow up since her IE.         TREATMENT AREA =  Pain in right leg [M79.604]  Other low back pain [M54.59]    OBJECTIVE  Therapeutic Procedures:  Tx Min Billable or 1:1 Min (if diff from Tx Min) Procedure, Rationale, Specifics   15  27971 Therapeutic Exercise (timed):  increase ROM, strength, coordination, balance, and proprioception to improve patient's ability to progress to PLOF and address remaining functional goals. (see flow sheet as applicable)     Details if applicable:       15  19165 Neuromuscular Re-Education (timed):  improve balance, coordination, kinesthetic sense, posture, core stability and proprioception to improve patient's ability to develop conscious control of individual muscles and awareness of position of extremities in order to progress to PLOF and address remaining functional goals. (see flow sheet as applicable)     Details if applicable:     10  64766 Self Care/Home Management (timed):  improve patient knowledge and understanding of pain reducing techniques, posture/ergonomics, home safety, activity modification, diagnosis/prognosis, and physical therapy expectations, procedures and progression  to improve patient's ability to progress to PLOF and address remaining functional goals.  (see flow sheet as applicable)     Details if applicable:     40  [facility] Totals Reminder: bill using total billable min of TIMED therapeutic procedures (example: do not include dry needle or estim

## 2025-05-13 ENCOUNTER — APPOINTMENT (OUTPATIENT)
Facility: HOSPITAL | Age: 60
End: 2025-05-13
Payer: COMMERCIAL

## 2025-05-16 ENCOUNTER — APPOINTMENT (OUTPATIENT)
Facility: HOSPITAL | Age: 60
End: 2025-05-16
Payer: COMMERCIAL

## 2025-05-23 ENCOUNTER — HOSPITAL ENCOUNTER (OUTPATIENT)
Facility: HOSPITAL | Age: 60
Setting detail: RECURRING SERIES
End: 2025-05-23
Payer: COMMERCIAL

## 2025-05-30 ENCOUNTER — APPOINTMENT (OUTPATIENT)
Facility: HOSPITAL | Age: 60
End: 2025-05-30
Payer: COMMERCIAL